# Patient Record
Sex: MALE | Race: BLACK OR AFRICAN AMERICAN | NOT HISPANIC OR LATINO | Employment: UNEMPLOYED | URBAN - METROPOLITAN AREA
[De-identification: names, ages, dates, MRNs, and addresses within clinical notes are randomized per-mention and may not be internally consistent; named-entity substitution may affect disease eponyms.]

---

## 2023-03-31 ENCOUNTER — HOSPITAL ENCOUNTER (EMERGENCY)
Facility: MEDICAL CENTER | Age: 33
End: 2023-03-31
Attending: EMERGENCY MEDICINE

## 2023-03-31 VITALS
OXYGEN SATURATION: 96 % | SYSTOLIC BLOOD PRESSURE: 154 MMHG | DIASTOLIC BLOOD PRESSURE: 87 MMHG | BODY MASS INDEX: 30.41 KG/M2 | WEIGHT: 200.62 LBS | HEIGHT: 68 IN | HEART RATE: 113 BPM | TEMPERATURE: 97 F | RESPIRATION RATE: 20 BRPM

## 2023-03-31 DIAGNOSIS — K13.70 ORAL LESION: ICD-10-CM

## 2023-03-31 LAB
HIV 1+2 AB+HIV1 P24 AG SERPL QL IA: NORMAL
T PALLIDUM AB SER QL IA: NORMAL

## 2023-03-31 PROCEDURE — 86780 TREPONEMA PALLIDUM: CPT

## 2023-03-31 PROCEDURE — 36415 COLL VENOUS BLD VENIPUNCTURE: CPT

## 2023-03-31 PROCEDURE — 87389 HIV-1 AG W/HIV-1&-2 AB AG IA: CPT

## 2023-03-31 PROCEDURE — 99283 EMERGENCY DEPT VISIT LOW MDM: CPT

## 2023-03-31 ASSESSMENT — LIFESTYLE VARIABLES
HOW MANY TIMES IN THE PAST YEAR HAVE YOU HAD 5 OR MORE DRINKS IN A DAY: 0
EVER HAD A DRINK FIRST THING IN THE MORNING TO STEADY YOUR NERVES TO GET RID OF A HANGOVER: NO
AVERAGE NUMBER OF DAYS PER WEEK YOU HAVE A DRINK CONTAINING ALCOHOL: 0
TOTAL SCORE: 0
EVER FELT BAD OR GUILTY ABOUT YOUR DRINKING: NO
TOTAL SCORE: 0
TOTAL SCORE: 0
HAVE YOU EVER FELT YOU SHOULD CUT DOWN ON YOUR DRINKING: NO
HAVE PEOPLE ANNOYED YOU BY CRITICIZING YOUR DRINKING: NO
CONSUMPTION TOTAL: NEGATIVE
DO YOU DRINK ALCOHOL: NO
ON A TYPICAL DAY WHEN YOU DRINK ALCOHOL HOW MANY DRINKS DO YOU HAVE: 0

## 2023-03-31 NOTE — ED TRIAGE NOTES
Pt amb to triage.  Chief Complaint   Patient presents with    Skin Lesion     Left side of tongue, x2d

## 2023-03-31 NOTE — ED NOTES
Travelle Irene and family member not in room, gown left on gurney. Patient not in bathroom. Patient left AMA

## 2023-03-31 NOTE — ED NOTES
Placed PIV. Pt refusing basic lab work-CBC and CMP. Educated on why we are obtaining this blood work, pt refused.

## 2023-03-31 NOTE — ED PROVIDER NOTES
"ED Provider Note    CHIEF COMPLAINT  Chief Complaint   Patient presents with    Skin Lesion     Left side of tongue, x2d     EXTERNAL RECORDS REVIEWED  None.     HPI/ROS  LIMITATION TO HISTORY   Select: : None  OUTSIDE HISTORIAN(S):  None.     Law Bonilla is a 32 y.o. male who presents to the Emergency Department with a painful lesion on the left side of his tongue onset 2 days ago. He describes he bit his tongue and when he looked in the mirror he noticed the lesion, however he thinks it may have been present before he bit it. The patient notes associated lip dryness and possible additional lesions to his lips and mouth. He denies any associated fever or recent URI symptoms. Denies alcohol or tobacco use. Denies any history of prior mouth lesions. Reports negative HIV test 6 months ago.     PAST MEDICAL HISTORY  History reviewed. No pertinent past medical history.     SURGICAL HISTORY  History reviewed. No pertinent surgical history.     FAMILY HISTORY  History reviewed. No pertinent family history.    SOCIAL HISTORY   reports that he has never smoked. He has never used smokeless tobacco. He reports current alcohol use. He reports that he does not use drugs.    ALLERGIES  Patient has no known allergies.    PHYSICAL EXAM  BP (!) 146/96   Pulse (!) 115   Temp 36.1 °C (97 °F) (Temporal)   Resp 18   Ht 1.727 m (5' 8\")   Wt 91 kg (200 lb 9.9 oz)   SpO2 98%      Constitutional: Nontoxic appearing. Alert in no apparent distress.  HENT: Normocephalic. Bilateral external ears normal. Nose normal.  Moist mucous membranes. Small white sore on left lateral aspect of the tongue, possibly small white punctate lesions on the tip of the togue and inside the upper lip. No lesions on the skin, no open sores or vesicular lesions.   Neck: Supple, full range of motion  Musculoskeletal: Atraumatic. No obvious deformities noted.  No lower extremity edema.  Skin: Warm, Dry.  No erythema, No rash.   Neurologic: Alert and " oriented x3. Moving all extremities spontaneously without focal deficits.  Psychiatric: Affect normal, Mood normal, Appears appropriate and not intoxicated.     DIAGNOSTIC STUDIES / PROCEDURES    LABS  Labs Reviewed   HIV AG/AB COMBO ASSAY SCREENING   T.PALLIDUM AB DARNELL (SCREENING)         COURSE & MEDICAL DECISION MAKING  11:12 AM - Patient seen and examined at bedside. Discussed plan of care, including lab work. Patient agrees to the plan of care. Ordered for T. Pallidum AB DARNELL screening, CBC w/ diff, BMP, and HIV screening to evaluate his symptoms.     ED Observation Status? Yes; I am placing the patient in to an observation status due to a diagnostic uncertainty as well as therapeutic intensity. Patient placed in observation status at 11:30 AM, 3/31/2023.     Observation plan is as follows: Lab work and further monitoring     Upon Reevaluation, the patient's condition has: Patient left before tests resulted.     Patient discharged from ED Observation status at 1:25 PM (Time) 03/31/23 (Date).     INITIAL ASSESSMENT, COURSE AND PLAN  Care Narrative: Patient presents with concern for lesion to the lateral aspect of the tongue.  He is tachycardic on arrival and anxious appearing.  Lesion may be due to granulation tissue after biting his tongue.  There are very small other lesions in the upper lip and tip of the tongue which he is concerned about however are very minor.  It does not appear as herpes infection at this time.  Patient tested for HIV and syphilis which were negative.  Suspect aphthous ulcer or trauma from tongue bite, could also be stomatitis related to viral illness.    12:07 PM - Patient was reevaluated at bedside. Patient refused basic lab work at this time and is only interested in HIV  and syphilis testing.     1:25 PM - Patient eloped from the ED before tests were resulted.       ADDITIONAL PROBLEM LIST  Problem #1: Oral lesions - plan to discharge home with peridex mouthwash and symptomatic care  however patient left prior to discharge    DISPOSITION AND DISCUSSIONS  Escalation of care considered, and ultimately not performed:blood analysis    Barriers to care at this time, including but not limited to: Patient does not have established PCP.     Decision tools and prescription drugs considered including, but not limited to: Antibiotics no signs of bacterial infeciton .      DISPOSITION:  Patient eloped prior to discharge        FINAL DIAGNOSIS  1. Oral lesion        The note accurately reflects work and decisions made by me.  Taniya Williamson M.D.  3/31/2023  9:21 PM     Pat DUNHAM (Krista), am scribing for, and in the presence of, Taniya Williamson M.D..    Electronically signed by: Pat Noland), 3/31/2023    Taniya DUNHAM M.D. personally performed the services described in this documentation, as scribed by Pat Bolden in my presence, and it is both accurate and complete.

## 2023-03-31 NOTE — ED NOTES
Report received from prior RN. Pt alert. Resp normal/unlabored. Bed side rails up/in low position. Pt updated to POC and all questions answered.     Pt requesting IV removal